# Patient Record
Sex: FEMALE | Race: BLACK OR AFRICAN AMERICAN | NOT HISPANIC OR LATINO | Employment: FULL TIME | URBAN - METROPOLITAN AREA
[De-identification: names, ages, dates, MRNs, and addresses within clinical notes are randomized per-mention and may not be internally consistent; named-entity substitution may affect disease eponyms.]

---

## 2020-09-18 ENCOUNTER — LAB VISIT (OUTPATIENT)
Dept: LAB | Facility: HOSPITAL | Age: 35
End: 2020-09-18
Payer: COMMERCIAL

## 2020-09-18 ENCOUNTER — OFFICE VISIT (OUTPATIENT)
Dept: FAMILY MEDICINE | Facility: CLINIC | Age: 35
End: 2020-09-18
Payer: COMMERCIAL

## 2020-09-18 VITALS
HEART RATE: 80 BPM | SYSTOLIC BLOOD PRESSURE: 122 MMHG | HEIGHT: 65 IN | DIASTOLIC BLOOD PRESSURE: 80 MMHG | WEIGHT: 176.38 LBS | TEMPERATURE: 98 F | BODY MASS INDEX: 29.38 KG/M2 | OXYGEN SATURATION: 98 %

## 2020-09-18 DIAGNOSIS — Z00.00 WELL ADULT EXAM: Primary | ICD-10-CM

## 2020-09-18 DIAGNOSIS — R55 NEAR SYNCOPE: ICD-10-CM

## 2020-09-18 DIAGNOSIS — Z00.00 WELL ADULT EXAM: ICD-10-CM

## 2020-09-18 LAB
ALBUMIN SERPL BCP-MCNC: 4 G/DL (ref 3.5–5.2)
ALP SERPL-CCNC: 60 U/L (ref 55–135)
ALT SERPL W/O P-5'-P-CCNC: 10 U/L (ref 10–44)
ANION GAP SERPL CALC-SCNC: 8 MMOL/L (ref 8–16)
AST SERPL-CCNC: 26 U/L (ref 10–40)
BASOPHILS # BLD AUTO: 0.05 K/UL (ref 0–0.2)
BASOPHILS NFR BLD: 0.5 % (ref 0–1.9)
BILIRUB SERPL-MCNC: 0.7 MG/DL (ref 0.1–1)
BUN SERPL-MCNC: 10 MG/DL (ref 6–20)
CALCIUM SERPL-MCNC: 9 MG/DL (ref 8.7–10.5)
CHLORIDE SERPL-SCNC: 106 MMOL/L (ref 95–110)
CHOLEST SERPL-MCNC: 164 MG/DL (ref 120–199)
CHOLEST/HDLC SERPL: 2.7 {RATIO} (ref 2–5)
CO2 SERPL-SCNC: 25 MMOL/L (ref 23–29)
CREAT SERPL-MCNC: 0.8 MG/DL (ref 0.5–1.4)
DIFFERENTIAL METHOD: ABNORMAL
EOSINOPHIL # BLD AUTO: 0.1 K/UL (ref 0–0.5)
EOSINOPHIL NFR BLD: 0.6 % (ref 0–8)
ERYTHROCYTE [DISTWIDTH] IN BLOOD BY AUTOMATED COUNT: 11.6 % (ref 11.5–14.5)
EST. GFR  (AFRICAN AMERICAN): >60 ML/MIN/1.73 M^2
EST. GFR  (NON AFRICAN AMERICAN): >60 ML/MIN/1.73 M^2
GLUCOSE SERPL-MCNC: 73 MG/DL (ref 70–110)
HCT VFR BLD AUTO: 36.3 % (ref 37–48.5)
HDLC SERPL-MCNC: 61 MG/DL (ref 40–75)
HDLC SERPL: 37.2 % (ref 20–50)
HGB BLD-MCNC: 11.3 G/DL (ref 12–16)
IMM GRANULOCYTES # BLD AUTO: 0.03 K/UL (ref 0–0.04)
IMM GRANULOCYTES NFR BLD AUTO: 0.3 % (ref 0–0.5)
LDLC SERPL CALC-MCNC: 94.4 MG/DL (ref 63–159)
LYMPHOCYTES # BLD AUTO: 2 K/UL (ref 1–4.8)
LYMPHOCYTES NFR BLD: 18.4 % (ref 18–48)
MCH RBC QN AUTO: 29.8 PG (ref 27–31)
MCHC RBC AUTO-ENTMCNC: 31.1 G/DL (ref 32–36)
MCV RBC AUTO: 96 FL (ref 82–98)
MONOCYTES # BLD AUTO: 1.1 K/UL (ref 0.3–1)
MONOCYTES NFR BLD: 10.1 % (ref 4–15)
NEUTROPHILS # BLD AUTO: 7.6 K/UL (ref 1.8–7.7)
NEUTROPHILS NFR BLD: 70.1 % (ref 38–73)
NONHDLC SERPL-MCNC: 103 MG/DL
NRBC BLD-RTO: 0 /100 WBC
PLATELET # BLD AUTO: 288 K/UL (ref 150–350)
PMV BLD AUTO: 11.1 FL (ref 9.2–12.9)
POTASSIUM SERPL-SCNC: 3.4 MMOL/L (ref 3.5–5.1)
PROT SERPL-MCNC: 7.7 G/DL (ref 6–8.4)
RBC # BLD AUTO: 3.79 M/UL (ref 4–5.4)
SODIUM SERPL-SCNC: 139 MMOL/L (ref 136–145)
TRIGL SERPL-MCNC: 43 MG/DL (ref 30–150)
TSH SERPL DL<=0.005 MIU/L-ACNC: 1.1 UIU/ML (ref 0.4–4)
WBC # BLD AUTO: 10.82 K/UL (ref 3.9–12.7)

## 2020-09-18 PROCEDURE — 86703 HIV-1/HIV-2 1 RESULT ANTBDY: CPT

## 2020-09-18 PROCEDURE — 99999 PR PBB SHADOW E&M-NEW PATIENT-LVL III: ICD-10-PCS | Mod: PBBFAC,,, | Performed by: FAMILY MEDICINE

## 2020-09-18 PROCEDURE — 99385 PR PREVENTIVE VISIT,NEW,18-39: ICD-10-PCS | Mod: S$GLB,,, | Performed by: FAMILY MEDICINE

## 2020-09-18 PROCEDURE — 99999 PR PBB SHADOW E&M-NEW PATIENT-LVL III: CPT | Mod: PBBFAC,,, | Performed by: FAMILY MEDICINE

## 2020-09-18 PROCEDURE — 99385 PREV VISIT NEW AGE 18-39: CPT | Mod: S$GLB,,, | Performed by: FAMILY MEDICINE

## 2020-09-18 PROCEDURE — 36415 COLL VENOUS BLD VENIPUNCTURE: CPT | Mod: PO

## 2020-09-18 PROCEDURE — 86803 HEPATITIS C AB TEST: CPT

## 2020-09-18 PROCEDURE — 80053 COMPREHEN METABOLIC PANEL: CPT

## 2020-09-18 PROCEDURE — 84443 ASSAY THYROID STIM HORMONE: CPT

## 2020-09-18 PROCEDURE — 85025 COMPLETE CBC W/AUTO DIFF WBC: CPT

## 2020-09-18 PROCEDURE — 83036 HEMOGLOBIN GLYCOSYLATED A1C: CPT

## 2020-09-18 PROCEDURE — 80061 LIPID PANEL: CPT

## 2020-09-19 LAB
ESTIMATED AVG GLUCOSE: 82 MG/DL (ref 68–131)
HBA1C MFR BLD HPLC: 4.5 % (ref 4–5.6)

## 2020-09-20 NOTE — PROGRESS NOTES
Chief Complaint:    Chief Complaint   Patient presents with    \[facility] [place] [facility]\"" Care       History of Present Illness:    Presents today to reestablish care  She is doing okay other than sometimes she feels like she is about to pass out happens pretty much on a daily basis only last for few seconds  Does not feel weak or any other symptoms not feel dizzy.    ROS:  Review of Systems   Constitutional: Negative for activity change, chills, fatigue, fever and unexpected weight change.   HENT: Negative for congestion, ear discharge, ear pain, hearing loss, postnasal drip and rhinorrhea.    Eyes: Negative for pain and visual disturbance.   Respiratory: Negative for cough, chest tightness and shortness of breath.    Cardiovascular: Negative for chest pain and palpitations.   Gastrointestinal: Negative for abdominal pain, diarrhea and vomiting.   Endocrine: Negative for heat intolerance.   Genitourinary: Negative for dysuria, flank pain, frequency and hematuria.   Musculoskeletal: Negative for back pain, gait problem and neck pain.   Skin: Negative for color change and rash.   Neurological: Negative for dizziness, tremors, seizures, numbness and headaches.   Psychiatric/Behavioral: Negative for agitation, hallucinations, self-injury, sleep disturbance and suicidal ideas. The patient is not nervous/anxious.        History reviewed. No pertinent past medical history.    Social History:  Social History     Socioeconomic History    Marital status: Single     Spouse name: Not on file    Number of children: 0    Years of education: Not on file    Highest education level: Not on file   Occupational History    Not on file   Social Needs    Financial resource strain: Not hard at all    Food insecurity     Worry: Never true     Inability: Never true    Transportation needs     Medical: No     Non-medical: No   Tobacco Use    Smoking status: Never Smoker    Smokeless tobacco: Never Used   Substance and Sexual Activity     "Alcohol use: Yes     Frequency: 2-4 times a month     Drinks per session: 1 or 2     Binge frequency: Monthly     Comment: occasional     Drug use: Never    Sexual activity: Yes     Partners: Female     Birth control/protection: None   Lifestyle    Physical activity     Days per week: 5 days     Minutes per session: 90 min    Stress: Not at all   Relationships    Social connections     Talks on phone: More than three times a week     Gets together: More than three times a week     Attends [employer] service: Not on file     Active member of club or organization: No     Attends meetings of clubs or organizations: Patient refused     Relationship status: Living with partner   Other Topics Concern    Not on file   Social History Narrative    Not on file       Family History:   family history includes Diabetes in her mother; Heart disease in her mother; Hypertension in her brother and mother.    Health Maintenance   Topic Date Due    Hepatitis C Screening  1986    TETANUS VACCINE  08/12/2004    Lipid Panel  Completed       Physical Exam:    Vital Signs  Temp: 98.2 °F (36.8 °C)  Temp src: Temporal  Pulse: 80  SpO2: 98 %  BP: 122/80  Pain Score: 0-No pain  Height and Weight  Height: 5' 5" (165.1 cm)  Weight: 80 kg (176 lb 5.9 oz)  BSA (Calculated - sq m): 1.92 sq meters  BMI (Calculated): 29.3  Weight in (lb) to have BMI = 25: 149.9]    Body mass index is 29.35 kg/m².    Physical Exam  Constitutional:       Appearance: She is well-developed.   Eyes:      Conjunctiva/sclera: Conjunctivae normal.      Pupils: Pupils are equal, round, and reactive to light.   Neck:      Musculoskeletal: Normal range of motion and neck supple.   Cardiovascular:      Rate and Rhythm: Normal rate and regular rhythm.      Heart sounds: Normal heart sounds. No murmur.   Pulmonary:      Effort: Pulmonary effort is normal. No respiratory distress.      Breath sounds: Normal breath sounds. No wheezing or rales.   Chest:      Chest " wall: No tenderness.   Abdominal:      General: There is no distension.      Palpations: Abdomen is soft. There is no mass.      Tenderness: There is no abdominal tenderness. There is no guarding.   Musculoskeletal:         General: No tenderness.   Lymphadenopathy:      Cervical: No cervical adenopathy.   Skin:     General: Skin is warm and dry.   Neurological:      Mental Status: She is alert and oriented to person, place, and time.      Deep Tendon Reflexes: Reflexes are normal and symmetric.   Psychiatric:         Behavior: Behavior normal.         Thought Content: Thought content normal.         Judgment: Judgment normal.           Assessment:      ICD-10-CM ICD-9-CM   1. Well adult exam  Z00.00 V70.0   2. Near syncope  R55 780.2         Plan:         Adelaida was seen today for establish care.    Diagnoses and all orders for this visit:    Well adult exam  -     CBC auto differential; Future  -     Comprehensive metabolic panel; Future  -     Lipid Panel; Future  -     Hemoglobin A1C; Future  -     HIV 1/2 Ag/Ab (4th Gen); Future  -     TSH; Future  -     Holter monitor - 48 hour; Future  -     Hepatitis C Antibody; Future    Near syncope  -     CBC auto differential; Future  -     Comprehensive metabolic panel; Future  -     Lipid Panel; Future  -     Hemoglobin A1C; Future  -     HIV 1/2 Ag/Ab (4th Gen); Future  -     TSH; Future  -     Holter monitor - 48 hour; Future  -     Hepatitis C Antibody; Future            Orders Placed This Encounter   Procedures    CBC auto differential    Comprehensive metabolic panel    Lipid Panel    Hemoglobin A1C    HIV 1/2 Ag/Ab (4th Gen)    TSH    Hepatitis C Antibody    Holter monitor - 48 hour       No current outpatient medications on file.     No current facility-administered medications for this visit.        There are no discontinued medications.    No follow-ups on file.      Yana Patel MD

## 2020-09-21 LAB — HIV 1+2 AB+HIV1 P24 AG SERPL QL IA: NEGATIVE

## 2020-09-22 LAB — HCV AB SERPL QL IA: NEGATIVE

## 2020-09-28 ENCOUNTER — PATIENT MESSAGE (OUTPATIENT)
Dept: FAMILY MEDICINE | Facility: CLINIC | Age: 35
End: 2020-09-28

## 2020-09-29 ENCOUNTER — PATIENT MESSAGE (OUTPATIENT)
Dept: FAMILY MEDICINE | Facility: CLINIC | Age: 35
End: 2020-09-29

## 2021-01-13 ENCOUNTER — TELEPHONE (OUTPATIENT)
Dept: FAMILY MEDICINE | Facility: CLINIC | Age: 36
End: 2021-01-13

## 2021-01-13 ENCOUNTER — PATIENT MESSAGE (OUTPATIENT)
Dept: FAMILY MEDICINE | Facility: CLINIC | Age: 36
End: 2021-01-13

## 2021-01-13 DIAGNOSIS — D64.9 ANEMIA, UNSPECIFIED TYPE: Primary | ICD-10-CM

## 2021-03-19 ENCOUNTER — PATIENT MESSAGE (OUTPATIENT)
Dept: FAMILY MEDICINE | Facility: CLINIC | Age: 36
End: 2021-03-19

## 2021-04-28 ENCOUNTER — PATIENT MESSAGE (OUTPATIENT)
Dept: RESEARCH | Facility: HOSPITAL | Age: 36
End: 2021-04-28

## 2021-08-26 ENCOUNTER — PATIENT MESSAGE (OUTPATIENT)
Dept: ADMINISTRATIVE | Facility: HOSPITAL | Age: 36
End: 2021-08-26

## 2021-08-27 ENCOUNTER — PATIENT MESSAGE (OUTPATIENT)
Dept: FAMILY MEDICINE | Facility: CLINIC | Age: 36
End: 2021-08-27

## 2021-09-21 ENCOUNTER — HOSPITAL ENCOUNTER (OUTPATIENT)
Dept: RADIOLOGY | Facility: HOSPITAL | Age: 36
Discharge: HOME | End: 2021-09-21
Attending: FAMILY MEDICINE
Payer: COMMERCIAL

## 2021-09-21 ENCOUNTER — OFFICE VISIT (OUTPATIENT)
Dept: FAMILY MEDICINE | Facility: CLINIC | Age: 36
End: 2021-09-21
Payer: COMMERCIAL

## 2021-09-21 VITALS
HEIGHT: 65 IN | HEART RATE: 70 BPM | SYSTOLIC BLOOD PRESSURE: 130 MMHG | BODY MASS INDEX: 34.31 KG/M2 | TEMPERATURE: 100 F | WEIGHT: 205.94 LBS | OXYGEN SATURATION: 100 % | DIASTOLIC BLOOD PRESSURE: 85 MMHG

## 2021-09-21 DIAGNOSIS — G89.29 HEEL PAIN, CHRONIC, LEFT: ICD-10-CM

## 2021-09-21 DIAGNOSIS — M79.672 HEEL PAIN, CHRONIC, LEFT: ICD-10-CM

## 2021-09-21 DIAGNOSIS — D64.9 ANEMIA, UNSPECIFIED TYPE: ICD-10-CM

## 2021-09-21 DIAGNOSIS — M79.671 RIGHT FOOT PAIN: ICD-10-CM

## 2021-09-21 DIAGNOSIS — R63.5 RECENT WEIGHT GAIN: Primary | ICD-10-CM

## 2021-09-21 PROCEDURE — 99214 OFFICE O/P EST MOD 30 MIN: CPT | Mod: S$GLB,,, | Performed by: FAMILY MEDICINE

## 2021-09-21 PROCEDURE — 99214 PR OFFICE/OUTPT VISIT, EST, LEVL IV, 30-39 MIN: ICD-10-PCS | Mod: S$GLB,,, | Performed by: FAMILY MEDICINE

## 2021-09-21 PROCEDURE — 73630 X-RAY EXAM OF FOOT: CPT | Mod: 26,RT,, | Performed by: RADIOLOGY

## 2021-09-21 PROCEDURE — 99999 PR PBB SHADOW E&M-EST. PATIENT-LVL III: ICD-10-PCS | Mod: PBBFAC,,, | Performed by: FAMILY MEDICINE

## 2021-09-21 PROCEDURE — 73630 XR FOOT COMPLETE 3 VIEW RIGHT: ICD-10-PCS | Mod: 26,RT,, | Performed by: RADIOLOGY

## 2021-09-21 PROCEDURE — 73630 X-RAY EXAM OF FOOT: CPT | Mod: TC,FY,PO,RT

## 2021-09-21 PROCEDURE — 99999 PR PBB SHADOW E&M-EST. PATIENT-LVL III: CPT | Mod: PBBFAC,,, | Performed by: FAMILY MEDICINE

## 2021-09-22 ENCOUNTER — PATIENT MESSAGE (OUTPATIENT)
Dept: FAMILY MEDICINE | Facility: CLINIC | Age: 36
End: 2021-09-22

## 2021-09-27 ENCOUNTER — PATIENT MESSAGE (OUTPATIENT)
Dept: FAMILY MEDICINE | Facility: CLINIC | Age: 36
End: 2021-09-27

## 2021-09-28 ENCOUNTER — TELEPHONE (OUTPATIENT)
Dept: FAMILY MEDICINE | Facility: CLINIC | Age: 36
End: 2021-09-28

## 2021-09-28 DIAGNOSIS — D64.9 ANEMIA, UNSPECIFIED TYPE: Primary | ICD-10-CM

## 2021-12-13 ENCOUNTER — PATIENT MESSAGE (OUTPATIENT)
Dept: FAMILY MEDICINE | Facility: CLINIC | Age: 36
End: 2021-12-13
Payer: COMMERCIAL

## 2021-12-20 ENCOUNTER — OFFICE VISIT (OUTPATIENT)
Dept: FAMILY MEDICINE | Facility: CLINIC | Age: 36
End: 2021-12-20
Payer: COMMERCIAL

## 2021-12-20 VITALS — WEIGHT: 181 LBS | HEIGHT: 65 IN | BODY MASS INDEX: 30.16 KG/M2

## 2021-12-20 DIAGNOSIS — E66.3 OVERWEIGHT (BMI 25.0-29.9): Primary | ICD-10-CM

## 2021-12-20 PROCEDURE — 99213 PR OFFICE/OUTPT VISIT, EST, LEVL III, 20-29 MIN: ICD-10-PCS | Mod: 95,,, | Performed by: FAMILY MEDICINE

## 2021-12-20 PROCEDURE — 99213 OFFICE O/P EST LOW 20 MIN: CPT | Mod: 95,,, | Performed by: FAMILY MEDICINE

## 2022-02-15 ENCOUNTER — PATIENT MESSAGE (OUTPATIENT)
Dept: HEMATOLOGY/ONCOLOGY | Facility: CLINIC | Age: 37
End: 2022-02-15
Payer: COMMERCIAL

## 2023-07-31 ENCOUNTER — PATIENT MESSAGE (OUTPATIENT)
Dept: RESEARCH | Facility: HOSPITAL | Age: 38
End: 2023-07-31
Payer: COMMERCIAL

## 2023-11-27 DIAGNOSIS — E66.3 OVERWEIGHT (BMI 25.0-29.9): Primary | ICD-10-CM

## 2024-06-10 ENCOUNTER — PATIENT MESSAGE (OUTPATIENT)
Dept: FAMILY MEDICINE | Facility: CLINIC | Age: 39
End: 2024-06-10

## 2024-06-27 ENCOUNTER — TELEPHONE (OUTPATIENT)
Dept: FAMILY MEDICINE | Facility: CLINIC | Age: 39
End: 2024-06-27

## 2024-06-27 ENCOUNTER — E-VISIT (OUTPATIENT)
Dept: FAMILY MEDICINE | Facility: CLINIC | Age: 39
End: 2024-06-27
Payer: COMMERCIAL

## 2024-06-27 DIAGNOSIS — E66.3 OVERWEIGHT (BMI 25.0-29.9): Primary | ICD-10-CM

## 2024-07-11 ENCOUNTER — E-VISIT (OUTPATIENT)
Dept: FAMILY MEDICINE | Facility: CLINIC | Age: 39
End: 2024-07-11
Payer: COMMERCIAL

## 2024-07-11 DIAGNOSIS — J06.9 UPPER RESPIRATORY TRACT INFECTION, UNSPECIFIED TYPE: Primary | ICD-10-CM

## 2024-07-12 RX ORDER — BENZONATATE 200 MG/1
200 CAPSULE ORAL 3 TIMES DAILY PRN
Qty: 30 CAPSULE | Refills: 0 | Status: SHIPPED | OUTPATIENT
Start: 2024-07-12 | End: 2024-07-22

## 2024-07-26 ENCOUNTER — OFFICE VISIT (OUTPATIENT)
Dept: FAMILY MEDICINE | Facility: CLINIC | Age: 39
End: 2024-07-26
Payer: COMMERCIAL

## 2024-07-26 VITALS
WEIGHT: 215.81 LBS | HEIGHT: 65 IN | OXYGEN SATURATION: 100 % | SYSTOLIC BLOOD PRESSURE: 151 MMHG | RESPIRATION RATE: 18 BRPM | BODY MASS INDEX: 35.96 KG/M2 | DIASTOLIC BLOOD PRESSURE: 101 MMHG | HEART RATE: 75 BPM

## 2024-07-26 DIAGNOSIS — E66.9 OBESITY (BMI 35.0-39.9 WITHOUT COMORBIDITY): Primary | ICD-10-CM

## 2024-07-26 DIAGNOSIS — Z00.00 WELL ADULT EXAM: ICD-10-CM

## 2024-07-26 DIAGNOSIS — I10 PRIMARY HYPERTENSION: ICD-10-CM

## 2024-07-26 PROCEDURE — 99213 OFFICE O/P EST LOW 20 MIN: CPT | Mod: 25,S$GLB,, | Performed by: FAMILY MEDICINE

## 2024-07-26 PROCEDURE — 99395 PREV VISIT EST AGE 18-39: CPT | Mod: S$GLB,,, | Performed by: FAMILY MEDICINE

## 2024-07-26 PROCEDURE — 99999 PR PBB SHADOW E&M-EST. PATIENT-LVL III: CPT | Mod: PBBFAC,,, | Performed by: FAMILY MEDICINE

## 2024-07-26 RX ORDER — VALSARTAN AND HYDROCHLOROTHIAZIDE 160; 12.5 MG/1; MG/1
1 TABLET, FILM COATED ORAL DAILY
Qty: 30 TABLET | Refills: 3 | Status: SHIPPED | OUTPATIENT
Start: 2024-07-26 | End: 2025-07-26

## 2024-10-10 ENCOUNTER — HOSPITAL ENCOUNTER (OUTPATIENT)
Dept: RADIOLOGY | Facility: HOSPITAL | Age: 39
Discharge: HOME | End: 2024-10-10
Attending: PODIATRIST
Payer: COMMERCIAL

## 2024-10-10 ENCOUNTER — OFFICE VISIT (OUTPATIENT)
Dept: PODIATRY | Facility: CLINIC | Age: 39
End: 2024-10-10
Payer: COMMERCIAL

## 2024-10-10 VITALS — BODY MASS INDEX: 35.96 KG/M2 | WEIGHT: 215.81 LBS | HEIGHT: 65 IN

## 2024-10-10 DIAGNOSIS — M62.462 GASTROCNEMIUS EQUINUS OF LEFT LOWER EXTREMITY: ICD-10-CM

## 2024-10-10 DIAGNOSIS — M72.2 PLANTAR FASCIITIS, BILATERAL: ICD-10-CM

## 2024-10-10 DIAGNOSIS — M20.21 HALLUX RIGIDUS, RIGHT FOOT: Primary | ICD-10-CM

## 2024-10-10 DIAGNOSIS — M62.461 GASTROCNEMIUS EQUINUS OF RIGHT LOWER EXTREMITY: ICD-10-CM

## 2024-10-10 DIAGNOSIS — M20.21 HALLUX RIGIDUS, RIGHT FOOT: ICD-10-CM

## 2024-10-10 DIAGNOSIS — L60.2 NAIL DISORDER (ONYCHOGRYPHOSIS): ICD-10-CM

## 2024-10-10 PROCEDURE — 99204 OFFICE O/P NEW MOD 45 MIN: CPT | Mod: S$GLB,,, | Performed by: PODIATRIST

## 2024-10-10 PROCEDURE — 73630 X-RAY EXAM OF FOOT: CPT | Mod: TC,RT

## 2024-10-10 PROCEDURE — 99999 PR PBB SHADOW E&M-EST. PATIENT-LVL III: CPT | Mod: PBBFAC,,, | Performed by: PODIATRIST

## 2024-10-10 PROCEDURE — 87101 SKIN FUNGI CULTURE: CPT | Performed by: PODIATRIST

## 2024-10-10 PROCEDURE — 73630 X-RAY EXAM OF FOOT: CPT | Mod: 26,RT,, | Performed by: RADIOLOGY

## 2024-10-10 RX ORDER — DICLOFENAC SODIUM 75 MG/1
75 TABLET, DELAYED RELEASE ORAL 2 TIMES DAILY
Qty: 20 TABLET | Refills: 0 | Status: SHIPPED | OUTPATIENT
Start: 2024-10-10 | End: 2024-10-20

## 2024-10-10 RX ORDER — CICLOPIROX 80 MG/ML
SOLUTION TOPICAL
Qty: 6.6 ML | Refills: 11 | Status: SHIPPED | OUTPATIENT
Start: 2024-10-10

## 2024-10-10 NOTE — PATIENT INSTRUCTIONS
Thank you for choosing [facility] and Dr. Ilnaa Johnson and her team to take care of you.      I have provided further information for you about your diagnoses and/or aftercare for treatment.     You may receive a survey asking you about your visit today. We hope that we have provided you with a 5-star experience! If you felt that you received exemplary care today, please consider leaving us this feedback on the survey that you will receive in the next few days.     The survey might arrive via email, [facility] messages, text messages, or paper mail.    We sincerely appreciate you!      Sincerely,  Dr. Ilana Johnson         PENLAC ANTI-FUNGAL TOPICAL INSTRUCTIONS   1. You have been prescribed Penlac nail lacquer (Ciclopirox) topical solution 8%. Go and  the prescription from your local pharmacy.    2. Remove any nail polish on your feet. File away (with emery board) loose nail material and trim nails.    3. Apply the Penlac nail lacquer (ciclopirox) Topical Solution, 8% once daily (preferably at bedtime or eight hours before washing) to all affected nails with the applicator brush provided. The nail lacquer should be applied evenly over the entire nail plate. If possible, apply the solution to the nail bed, hyponychium, and the under surface of the nail plate when if your nail is loosely attached. Remember fungus lives under the nail plate, therefore the more the solution penetrates the nail bed, the better.    3. Continue to apply the solution daily (at bedtime preferably).  Daily applications should be made over the previous coat and removed with alcohol every seven days. This cycle should be repeated throughout the duration of therapy.     4. On the 7th day, remove the solution from all of your nails with alcohol. File your nails again with an emery board and then repeat the cycle again.    5. This treatment must be consistent. If you skip a day, continue the cycle as recommended. It may take up  to 1 year for your nails to clear the fungal infection. Be patient.    It is recommended to dispose of all infected shoe gear that you will not likely wear again as well as weekly cleansing of all showering/bathing areas with antiseptics.Fungal spores like to hide in dark, warm, moist environments. Lastly, monthly treatments of all current shoe gear with moth balls x 8 hours.                  FUNGAL NAIL INFECTIONS    If your nail is thick and looks white or yellow, it may be infected with fungus. Nail   infections don't look pleasant, but they are not serious. There are treatments that   can clear up the infection.     What is a fungal nail infection?   It's easy to catch a fungal nail infection, and lots of people get them. The sooner you   treat an infection, the easier it is to get rid of it. The fungi that cause these infections often live in warm, damp places, such as showers and floors around changing rooms.   People used to think there was nothing you could do about a fungal nail infection. But   there are now good treatments that can get rid of it. However, they take a long time to   work (as long as one year if the infection is bad). So you need to be patient.    Fungal infection of the nails causes changes in the appearance of fingernails and toenails. They may thicken, discolor, change shape or split. This condition is difficult to treat because nails grow slowly and have limited blood supply. It is common to have the infection come back after treatment.       What are the symptoms?   Your nail may look whitish or yellowish, and raised up from the finger or toe. The skin   around your nail may turn red. Sometimes the nail lifts off altogether. If the infection is   severe, the nail may also be crumbly. It's more common to get an infection of a toenail   than a fingernail. If you've had an infection for a long time, it may be painful. If you have a badly infected toe, it may be difficult to walk. If your  "immune system is weak or you have diabetes, you may be more likely to get these infections. The infection can also be more serious. So it's important to see your doctor as soon as possible if you think you have a nail infection.     What treatments work?   To get rid of a fungal nail infection you will probably have to take tablets, sometimes for   several months. There are also topical solutions (over the counter and prescription) that  you can put on your nail, and  things you can do to try to avoid getting another nail infection.    There are two types of medicines used.   Topical anti-fungal medicines are applied to the surface of the skin and nail area. These medicines are not very effective because they cannot get deep into the nail.     Topical medicines are most useful in combination with oral medicines . Oral antifungal medicines are more effective because they penetrate the nail from the inside out.  If medicines fail, the nail can be removed surgically or chemically. This improves the effectiveness of medical treatment because the fungus is physically removed from the body.       Tablets   The tablets that doctors use most often are called itraconazole (brand name Sporanox)   and terbinafine (Lamisil). Terbinafine seems to work slightly better. If you take terbinafine every day for 12 weeks, there's a 6 in 10 chance you'll get rid of   your fungal toenail infection.      How are fungal nail infections treated? -- Treatment depends, in part, on how severe the infection is, and how much it bothers you. If your infection is mild or doesn't bother you very much, you might choose not to treat it. An untreated nail infection probably won't go away, but it probably won't cause any long-term problems either.  When people need or choose to have treatment, it usually involves "antifungal" medicines that you get with a prescription from your doctor. These medicines are taken by mouth or put on the nail.Treatment " with pills usually lasts a few months. Some people who take these medicines need to have blood tests. That's because these medicines can affect the liver.  If you don't want to or can't take antifungal pills, your doctor will talk with you about other treatment options. These might include using an antifungal medicine on the nail or having surgery to remove your nail.    Before starting any of these treatments, you should know that:  ?It can take many months for your nail to look normal again.  ?There is a chance that the treatment won't work. The infection might not get better, or it might come back. If either of these things happen, your doctor can try another treatment or send you to a specialist.  Can fungal nail infections be prevented? -- Sometimes. To reduce your chance of getting one, you can:  ?Keep your feet clean and dry.  ?Avoid sharing nail tools, such as clippers and scissors.  ?Wear flip-flops or other footwear in a gym shower or locker room.  What if I want to get pregnant? -- If you want to get pregnant, let your doctor or nurse know. He or she might recommend that you not take certain antifungal medicines during pregnancy.    Alternative final options are:   If still no resolution with oral tablets or topics: then we can completely avulse/remove all involved toenails with subsequent treatment with topical antifungal solution.       Home Care:   1) Use medicines exactly as directed for as long as directed. Treating a fungal infection can take longer than other kinds of infections.   2) Smoking is a risk factor for fungal infection. This is one more reason to quit.   3) Wear absorbent socks and shoes that have ventilation. Sweaty feet increases risk of fungal infection and make an existing infection harder to treat.   4) Use footwear when in damp public places like swimming pools, gyms and shower rooms. This helps avoid exposure to the fungus that grows there.   It is recommended to dispose of all  infected shoe gear that you will not likely wear again as well as weekly cleansing of all showering/bathing areas with antiseptics.Fungal spores like to hide in dark, warm, moist environments. Lastly, monthly treatments of all current shoe gear with moth balls x 8 hours.       Follow Up   with your doctor as directed by our staff.   Get Prompt Medical Attention   if any of the following occur:   -- Skin alongside the nail becomes reddened, swollen, painful or drains pus   -- Side effects from oral anti-fungal medicines       © 7545-7171 [employer]. [address] [place] [address]. All rights reserved. This information is not intended as a substitute for professional medical care. Always follow your healthcare professional's instructions.

## 2024-10-10 NOTE — PROGRESS NOTES
Podiatry Department    Patient ID: Adelaida Riddle is a 39 y.o. female.    Chief Complaint: Nail Problem (C/o  toenail growth and toe pain , pt right great toe is really stiff and swollen and c/o left great toe is discolored and c/o heel pain also, pt rates pain 8/10, pt is non-diabetic)    History of Present Illness    CHIEF COMPLAINT:  Patient presents today for foot pain and swelling.    RIGHT FOOT PAIN AND SWELLING:  She reports right foot swelling and pain since last year, exacerbated by walking. A scan performed by her PCP in January of last year reportedly indicated possible bone deterioration. No history of sports-related injuries or accidents.    BILATERAL HEEL PAIN:  She experiences heel pain in both feet, localized to the heel area and worsened by walking. This may be consistent with plantar fasciitis. Her work as a  requires frequent standing, and she wears steel toe boots for work.    RIGHT FIRST MTPJ ARTHRITIS:  She reports pain in the right first metatarsophalangeal joint (MTPJ) with limited range of motion. She has a history of playing basketball. An X-ray in 2021 showed evidence of arthritis.    TOENAIL FUNGUS:  She presents with toenail fungus affecting multiple toes. The left hallux has a darkened dystrophic toenail. The bilateral fifth toenails show dark discoloration with subungual debris. She denies pain associated with the fungal infection. No Montemayor sign observed on the left fifth toenail.    PENDING LABORATORY TESTS:  She was ordered to complete labs two weeks ago by her primary care physician, including a comprehensive panel and hemoglobin. These tests have not yet been completed.      ROS:  Musculoskeletal: -muscle pain, -joint pain, +limb swelling            Physical Exam    MSK: Foot/Ankle - Right: Limited range of motion with clicking and crepitus with dorsiflexion in right first MTPJ. Palpable exostosis on right first MTPJ.  MSK: Foot/Ankle - Left: Darkened  dystrophic toenail on left hallux. Negative Montemayor sign on left fifth toenail. Dark and discolored toenail with subungual debris on bilateral fifth toenail. Pronated foot type. Decreased dorsiflexion of ankle with knee extended and flexed. Mild tenderness to palpation at the medial tubercle of calcaneus.           Diagnoses:  1. Hallux rigidus, right foot  -     X-Ray Foot Complete Right; Future; Expected date: 10/10/2024    2. Plantar fasciitis, bilateral    3. Gastrocnemius equinus of right lower extremity    4. Gastrocnemius equinus of left lower extremity    5. Nail disorder (onychogryphosis)  -     Fungal culture , skin, hair, or nails  -     Hepatic function panel; Future; Expected date: 10/10/2024    Other orders  -     diclofenac (VOLTAREN) 75 MG EC tablet; Take 1 tablet (75 mg total) by mouth 2 (two) times daily. for 10 days  Dispense: 20 tablet; Refill: 0  -     ciclopirox (PENLAC) 8 % Soln; Apply to affected toenails at night time DAILY. On 7th day, file nails down, clean all nails with alcohol and restart application process.  Dispense: 6.6 mL; Refill: 11        Assessment & Plan    - Assessed right first MTPJ, noting severe arthritis with limited range of motion, clicking, and crepitus  - Evaluated bilateral heel pain, diagnosing plantar fasciitis due to tight Achilles tendon  - Identified fungal infection in toenails, considering topical and oral treatment options  - Reviewed previous x-ray from 2021, confirming arthritis progression  - Will obtain new x-ray for current arthritis assessment  - Considered steroid injection for toe joint if oral medication ineffective    PLANTAR FASCIITIS:  - Explained plantar fasciitis condition and its relation to tight Achilles tendon.  - Patient to perform prescribed stretching exercises 3 times daily for plantar fasciitis.  - Started diclofenac for 10 days for plantar fasciitis and arthritis pain.  - Ordered x-ray of right foot.    TOENAIL FUNGUS:  - Discussed  treatment options for toenail fungus, including topical and oral medications.  - Educated about the timeframe for fungal treatments: oral medication typically takes 3-6 months, while topical treatment may take 6-12 months.  - Started antifungal nail polish for toenail fungus, to be used as directed in patient information printout.  - Ordered nail culture of affected toenails.    FLAT FEET AND ARTHRITIS:  - Discussed appropriate footwear for flat feet and arthritis, recommending [employer] and [employer] brands.  - Patient to visit [employer] for orthotic inserts (10% discount provided).  - Recommend wearing motion-controlled shoes instead of steel-toed boots when possible.  - Referred to [employer] for custom orthotic inserts.    LABS:  - Ordered LFT.  - Ordered Comprehensive metabolic panel and hemoglobin A1C (as part of primary care follow-up).    FOLLOW UP:  - Follow up to review x-ray results and reassess pain.  - Contact the office if oral medication for toe joint pain is ineffective, to consider steroid injection.        The total visit time face to face with the patient was over 30 minutes. Time spent in counseling, discussion and coordination of care with the patient was over 15 minutes. Topics discussed as noted above.        Future Appointments   Date Time Provider Department Center   11/27/2024  2:15 PM Ilana Johnson DPM [facility]        This note was generated with the assistance of ambient listening technology. Verbal consent was obtained by the patient and accompanying visitor(s) for the recording of patient appointment to facilitate this note. I attest to having reviewed and edited the generated note for accuracy, though some syntax or spelling errors may persist. Please contact the author of this note for any clarification.      Report Electronically Signed By:     Ilana Johnson DPM   Podiatry  [facility]-   10/14/2024

## 2024-10-14 ENCOUNTER — PATIENT MESSAGE (OUTPATIENT)
Dept: FAMILY MEDICINE | Facility: CLINIC | Age: 39
End: 2024-10-14
Payer: COMMERCIAL

## 2024-10-14 DIAGNOSIS — D64.9 ANEMIA, UNSPECIFIED TYPE: Primary | ICD-10-CM

## 2024-10-14 LAB — FUNGUS BLD CULT: NORMAL

## 2024-10-24 ENCOUNTER — PATIENT MESSAGE (OUTPATIENT)
Dept: RESEARCH | Facility: HOSPITAL | Age: 39
End: 2024-10-24
Payer: COMMERCIAL

## 2024-12-03 ENCOUNTER — TELEPHONE (OUTPATIENT)
Dept: PODIATRY | Facility: CLINIC | Age: 39
End: 2024-12-03
Payer: COMMERCIAL

## 2024-12-27 RX ORDER — VALSARTAN AND HYDROCHLOROTHIAZIDE 160; 12.5 MG/1; MG/1
1 TABLET, FILM COATED ORAL DAILY
Qty: 30 TABLET | Refills: 3 | Status: SHIPPED | OUTPATIENT
Start: 2024-12-27 | End: 2025-12-27

## 2024-12-27 NOTE — TELEPHONE ENCOUNTER
No care due was identified.  Health [facility] Embedded Care Due Messages. Reference number: [id number].   12/26/2024 7:53:43 PM CST

## 2025-01-05 NOTE — TELEPHONE ENCOUNTER
No care due was identified.  Health [facility] Embedded Care Due Messages. Reference number: [id number].   1/05/2025 3:27:40 PM CST

## 2025-01-06 RX ORDER — VALSARTAN AND HYDROCHLOROTHIAZIDE 160; 12.5 MG/1; MG/1
1 TABLET, FILM COATED ORAL DAILY
Qty: 30 TABLET | Refills: 3 | Status: SHIPPED | OUTPATIENT
Start: 2025-01-06 | End: 2025-01-08 | Stop reason: SDUPTHER

## 2025-01-08 RX ORDER — VALSARTAN AND HYDROCHLOROTHIAZIDE 160; 12.5 MG/1; MG/1
1 TABLET, FILM COATED ORAL DAILY
Qty: 30 TABLET | Refills: 3 | Status: SHIPPED | OUTPATIENT
Start: 2025-01-08 | End: 2026-01-08

## 2025-01-08 NOTE — TELEPHONE ENCOUNTER
No care due was identified.  Health [facility] Embedded Care Due Messages. Reference number: [id number].   1/08/2025 9:52:11 AM CST

## 2025-01-09 ENCOUNTER — PATIENT MESSAGE (OUTPATIENT)
Dept: FAMILY MEDICINE | Facility: CLINIC | Age: 40
End: 2025-01-09
Payer: COMMERCIAL

## 2025-05-16 ENCOUNTER — PATIENT MESSAGE (OUTPATIENT)
Dept: FAMILY MEDICINE | Facility: CLINIC | Age: 40
End: 2025-05-16
Payer: COMMERCIAL

## 2025-05-16 DIAGNOSIS — B37.9 YEAST INFECTION: Primary | ICD-10-CM

## 2025-05-18 RX ORDER — FLUCONAZOLE 150 MG/1
150 TABLET ORAL DAILY
Qty: 1 TABLET | Refills: 0 | Status: SHIPPED | OUTPATIENT
Start: 2025-05-18 | End: 2025-05-19

## 2025-06-12 ENCOUNTER — OFFICE VISIT (OUTPATIENT)
Dept: FAMILY MEDICINE | Facility: CLINIC | Age: 40
End: 2025-06-12
Payer: COMMERCIAL

## 2025-06-12 VITALS
HEIGHT: 62 IN | HEART RATE: 74 BPM | DIASTOLIC BLOOD PRESSURE: 80 MMHG | WEIGHT: 227.38 LBS | OXYGEN SATURATION: 100 % | BODY MASS INDEX: 41.84 KG/M2 | SYSTOLIC BLOOD PRESSURE: 110 MMHG | TEMPERATURE: 99 F

## 2025-06-12 DIAGNOSIS — E66.01 MORBID OBESITY WITH BMI OF 40.0-44.9, ADULT: ICD-10-CM

## 2025-06-12 DIAGNOSIS — Z00.00 WELL ADULT EXAM: Primary | ICD-10-CM

## 2025-06-12 DIAGNOSIS — I10 PRIMARY HYPERTENSION: ICD-10-CM

## 2025-06-12 PROCEDURE — 99999 PR PBB SHADOW E&M-EST. PATIENT-LVL III: CPT | Mod: PBBFAC,,, | Performed by: FAMILY MEDICINE

## 2025-06-12 RX ORDER — VALSARTAN AND HYDROCHLOROTHIAZIDE 160; 12.5 MG/1; MG/1
1 TABLET, FILM COATED ORAL DAILY
Qty: 30 TABLET | Refills: 12 | Status: SHIPPED | OUTPATIENT
Start: 2025-06-12 | End: 2026-06-12

## 2025-06-12 NOTE — PROGRESS NOTES
Chief Complaint:    Chief Complaint   Patient presents with    Annual Exam       History of Present Illness:    History of Present Illness    Patient presents today for follow up She reports good blood pressure control with regular medication compliance. She has approximately two weeks of blood pressure medication remaining. She reports previous weight management treatment with sublingual drops was ineffective due to persistent hunger and difficulty adhering to the dosing schedule. She has plantar fasciitis with pain that is exacerbated by increased body weight. She has been using antifungal medication for approximately three months. She has not received a tetanus vaccine in over 10 years.      ROS:  General: denies fever, denies chills, denies fatigue, denies weight gain, denies weight loss, denies loss of appetite  Eyes: denies vision changes, denies blurry vision, denies eye pain, denies eye discharge  ENT: denies ear pain, denies hearing loss, denies tinnitus, denies nasal congestion, denies sore throat  Cardiovascular: denies chest pain, denies palpitations, denies lower extremity edema  Respiratory: denies cough, denies shortness of breath, denies wheezing, denies sputum production  Endocrine: denies polyuria, denies polydipsia, denies heat intolerance, denies cold intolerance  Gastrointestinal: denies abdominal pain, denies heartburn, denies nausea, denies vomiting, denies diarrhea, denies constipation, denies blood in stool  Genitourinary: denies dysuria, denies urgency, denies frequency, denies hematuria, denies nocturia, denies incontinence  Heme & Lymphatic: denies easy or excessive bleeding, denies easy bruising, denies swollen lymph nodes  Musculoskeletal: denies muscle pain, denies back pain, denies joint pain, denies joint swelling, admits limb pain, admits pain with movement  Skin: denies rash, denies lesion, denies itching, denies skin texture changes, denies skin color changes  Neurological: denies  headache, denies dizziness, denies numbness, denies tingling, denies seizure activity, denies speech difficulty, denies memory loss, denies confusion  Psychiatric: denies anxiety, denies depression, denies sleep difficulty           Past Medical History:   Diagnosis Date    Abnormal Pap smear of cervix 01/15/2021    ASCUS Pap/+HPV 16    Anemia     Trichomoniasis 5/12/2021    Vaginitis and vulvovaginitis 5/12/2021       Social History:  Social History     Socioeconomic History    Marital status: Single    Number of children: 0   Tobacco Use    Smoking status: Never    Smokeless tobacco: Never   Substance and Sexual Activity    Alcohol use: Yes     Comment: occasional     Drug use: Never    Sexual activity: Yes     Partners: Female     Birth control/protection: None     Social Drivers of Health     Financial Resource Strain: Low Risk  (7/26/2024)    Overall Financial Resource Strain (CARDIA)     Difficulty of Paying Living Expenses: Not hard at all   Food Insecurity: No Food Insecurity (7/26/2024)    Hunger Vital Sign     Worried About Running Out of Food in the Last Year: Never true     Ran Out of Food in the Last Year: Never true   Transportation Needs: No Transportation Needs (12/20/2021)    PRAPARE - Transportation     Lack of Transportation (Medical): No     Lack of Transportation (Non-Medical): No   Physical Activity: Sufficiently Active (7/26/2024)    Exercise Vital Sign     Days of Exercise per Week: 3 days     Minutes of Exercise per Session: 80 min   Stress: No Stress Concern Present (7/26/2024)    [employer] - Occupational Stress Questionnaire     Feeling of Stress : Only a little   Housing Stability: Low Risk  (12/20/2021)    Housing Stability Vital Sign     Unable to Pay for Housing in the Last Year: No     Number of Places Lived in the Last Year: 1     Unstable Housing in the Last Year: No       Family History:   family history includes Diabetes in her mother and another  "family member; Heart disease in her mother and another family member; Hypertension in her brother, mother, and another family member.    Health Maintenance   Topic Date Due    TETANUS VACCINE  Never done    Cervical Cancer Screening  04/20/2024    COVID-19 Vaccine (3 - 2024-25 season) 09/01/2024    Influenza Vaccine (Season Ended) 09/01/2025    Hemoglobin A1c (Diabetic Prevention Screening)  10/10/2027    RSV Vaccine (Age 60+ and Pregnant patients) (1 - 1-dose 75+ series) 08/12/2061    Hepatitis C Screening  Completed    HIV Screening  Completed    Lipid Panel  Completed    Pneumococcal Vaccines (Age 0-49)  Aged Out       Exam:Physical     Vital Signs  Temp: 98.8 °F (37.1 °C)  Temp Source: Oral  Pulse: 74  SpO2: 100 %  BP: 110/80  BP Location: Left arm  Patient Position: Sitting  Pain Score: 0-No pain  Height and Weight  Height: 5' 2" (157.5 cm)  Weight: 103.1 kg (227 lb 6.5 oz)  BSA (Calculated - sq m): 2.12 sq meters  BMI (Calculated): 41.6  Weight in (lb) to have BMI = 25: 136.4]    Body mass index is 41.59 kg/m².    Physical Exam    General: Well-developed. Well-nourished. No acute distress.  Eyes: EOMI. Sclerae anicteric.  HENT: Normocephalic. Atraumatic. Nares patent. Moist oral mucosa.  Cardiovascular: Regular rate. Regular rhythm. No murmurs. No rubs. No gallops. Normal S1, S2.  Respiratory: Normal respiratory effort. Clear to auscultation bilaterally. No rales. No rhonchi. No wheezing.  Musculoskeletal: No  obvious deformity.  Extremities: No lower extremity edema.  Neurological: Alert & oriented x3. No slurred speech. Normal gait.  Psychiatric: Normal mood. Normal affect. Good insight. Good judgment.  Skin: Warm. Dry. No rash.           Assessment:        ICD-10-CM ICD-9-CM   1. Well adult exam  Z00.00 V70.0   2. Primary hypertension  I10 401.9   3. Morbid obesity with BMI of 40.0-44.9, adult  E66.01 278.01    Z68.41 V85.41         Plan:    Assessment & Plan    MEDICAL DECISION MAKING:  - Assessed BP " control, noting good compliance with current medication regimen.  - Evaluated weight loss efforts.  - Determined patient is a candidate for injectable weight loss medication (semaglutide/Ozempic), despite insurance coverage limitations due to non-diabetic status.  - Considered compounding pharmacy option for more affordable access to weight loss medication.  - Reviewed plantar fasciitis, noting potential exacerbation due to weight.  - Assessed need for tetanus vaccination, noting it has been over 10 years since last dose.    PATIENT EDUCATION:  - Explained that tetanus vaccination is recommended every 10 years for adults.  - Informed about potential side effects of weight loss medication, including nausea and stomach upset, typically lasting a few days.  - Educated on the appetite-suppressing mechanism of Ozempic.    MEDICATIONS:  - Started Ozempic (semaglutide) at a low dose, to be administered as a weekly self-injection.  - Continued BP medication.    ORDERS:  - Ordered labs.         Adelaida was seen today for annual exam.    Diagnoses and all orders for this visit:    Well adult exam  -     CBC Auto Differential; Future  -     Comprehensive Metabolic Panel; Future  -     Hemoglobin A1C; Future  -     Lipid Panel; Future    Primary hypertension  -     CBC Auto Differential; Future  -     Comprehensive Metabolic Panel; Future  -     Hemoglobin A1C; Future  -     Lipid Panel; Future    Morbid obesity with BMI of 40.0-44.9, adult    Other orders  -     valsartan-hydrochlorothiazide (DIOVAN-HCT) 160-12.5 mg per tablet; Take 1 tablet by mouth once daily.        No follow-ups on file.      Yana Patel MD

## 2025-06-13 ENCOUNTER — PATIENT MESSAGE (OUTPATIENT)
Dept: FAMILY MEDICINE | Facility: CLINIC | Age: 40
End: 2025-06-13
Payer: COMMERCIAL

## 2025-06-16 NOTE — TELEPHONE ENCOUNTER
No care due was identified.  [facility] Embedded Care Due Messages. Reference number: [id number].   6/16/2025 11:18:12 AM CDT

## 2025-07-08 ENCOUNTER — PATIENT MESSAGE (OUTPATIENT)
Dept: FAMILY MEDICINE | Facility: CLINIC | Age: 40
End: 2025-07-08
Payer: COMMERCIAL

## 2025-07-15 ENCOUNTER — PATIENT MESSAGE (OUTPATIENT)
Dept: FAMILY MEDICINE | Facility: CLINIC | Age: 40
End: 2025-07-15
Payer: COMMERCIAL

## 2025-07-15 DIAGNOSIS — E66.01 MORBID OBESITY WITH BMI OF 40.0-44.9, ADULT: Primary | ICD-10-CM

## 2025-07-16 NOTE — TELEPHONE ENCOUNTER
Care Due:                  Date            Visit Type   Department     Provider  --------------------------------------------------------------------------------                                EP -                              PRIMARY      [facility]  Last Visit: 06-      CARE ([facility])   MEDICINE       Yana Patel  Next Visit: None Scheduled  None         None Found                                                            Last  Test          Frequency    Reason                     Performed    Due Date  --------------------------------------------------------------------------------    CMP.........  12 months..  valsartan-hydrochlorothia  10-   10-                             zide.....................    HBA1C.......  6 months...  tirzepatide,.............  10-   04-    [facility] Embedded Care Due Messages. Reference number: [id number].   7/16/2025 11:44:22 AM CDT

## 2025-07-24 ENCOUNTER — OFFICE VISIT (OUTPATIENT)
Dept: FAMILY MEDICINE | Facility: CLINIC | Age: 40
End: 2025-07-24
Payer: COMMERCIAL

## 2025-07-24 VITALS — WEIGHT: 209 LBS | SYSTOLIC BLOOD PRESSURE: 110 MMHG | DIASTOLIC BLOOD PRESSURE: 88 MMHG | BODY MASS INDEX: 38.23 KG/M2

## 2025-07-24 DIAGNOSIS — E66.01 MORBID OBESITY WITH BMI OF 40.0-44.9, ADULT: ICD-10-CM

## 2025-07-24 DIAGNOSIS — I10 PRIMARY HYPERTENSION: Primary | ICD-10-CM

## 2025-07-24 NOTE — PROGRESS NOTES
Chief Complaint:  No chief complaint on file.      History of Present Illness:    History of Present Illness    Patient presents today for medication follow up and dose adjustment of tirzepatide. She reports current weight of 209 lbs, down from 227 lbs. She has been engaging in weight training exercises to support weight loss and muscle development. She reports blood pressure typically runs around 110/88 on self-monitoring without concerns. She continues tirzepatide 2.5 mg without side effects and valsartan hydrochlorothiazide daily with good compliance.      ROS:  General: denies fever, denies chills, denies fatigue, denies weight gain, denies weight loss, denies loss of appetite  Eyes: denies vision changes, denies blurry vision, denies eye pain, denies eye discharge  ENT: denies ear pain, denies hearing loss, denies tinnitus, denies nasal congestion, denies sore throat  Cardiovascular: denies chest pain, denies palpitations, denies lower extremity edema  Respiratory: denies cough, denies shortness of breath, denies wheezing, denies sputum production  Endocrine: denies polyuria, denies polydipsia, denies heat intolerance, denies cold intolerance  Gastrointestinal: denies abdominal pain, denies heartburn, denies nausea, denies vomiting, denies diarrhea, denies constipation, denies blood in stool  Genitourinary: denies dysuria, denies urgency, denies frequency, denies hematuria, denies nocturia, denies incontinence  Heme & Lymphatic: denies easy or excessive bleeding, denies easy bruising, denies swollen lymph nodes  Musculoskeletal: denies muscle pain, denies back pain, denies joint pain, denies joint swelling  Skin: denies rash, denies lesion, denies itching, denies skin texture changes, denies skin color changes  Neurological: denies headache, denies dizziness, denies numbness, denies tingling, denies seizure activity, denies speech difficulty, denies memory loss, denies confusion  Psychiatric: denies anxiety,  denies depression, denies sleep difficulty           Past Medical History:   Diagnosis Date    Abnormal Pap smear of cervix 01/15/2021    ASCUS Pap/+HPV 16    Anemia     Trichomoniasis 5/12/2021    Vaginitis and vulvovaginitis 5/12/2021       Social History:  Social History     Socioeconomic History    Marital status: Single    Number of children: 0   Tobacco Use    Smoking status: Never    Smokeless tobacco: Never   Substance and Sexual Activity    Alcohol use: Yes     Comment: occasional     Drug use: Never    Sexual activity: Yes     Partners: Female     Birth control/protection: None     Social Drivers of Health     Financial Resource Strain: Low Risk  (7/26/2024)    Overall Financial Resource Strain (CARDIA)     Difficulty of Paying Living Expenses: Not hard at all   Food Insecurity: No Food Insecurity (7/26/2024)    Hunger Vital Sign     Worried About Running Out of Food in the Last Year: Never true     Ran Out of Food in the Last Year: Never true   Transportation Needs: No Transportation Needs (12/20/2021)    PRAPARE - Transportation     Lack of Transportation (Medical): No     Lack of Transportation (Non-Medical): No   Physical Activity: Sufficiently Active (7/26/2024)    Exercise Vital Sign     Days of Exercise per Week: 3 days     Minutes of Exercise per Session: 80 min   Stress: No Stress Concern Present (7/26/2024)    [employer] - Occupational Stress Questionnaire     Feeling of Stress : Only a little   Housing Stability: Low Risk  (12/20/2021)    Housing Stability Vital Sign     Unable to Pay for Housing in the Last Year: No     Number of Places Lived in the Last Year: 1     Unstable Housing in the Last Year: No       Family History:   family history includes Diabetes in her mother and another family member; Heart disease in her mother and another family member; Hypertension in her brother, mother, and another family member.    Health Maintenance   Topic Date Due    TETANUS  VACCINE  Never done    Cervical Cancer Screening  04/20/2024    COVID-19 Vaccine (3 - 2024-25 season) 09/01/2024    Influenza Vaccine (1) 09/01/2025    Hemoglobin A1c (Diabetic Prevention Screening)  10/10/2027    RSV Vaccine (Age 60+ and Pregnant patients) (1 - 1-dose 75+ series) 08/12/2061    Hepatitis C Screening  Completed    HIV Screening  Completed    Lipid Panel  Completed    Pneumococcal Vaccines (Age 0-49)  Aged Out       Exam:Physical     Vital Signs  BP: 110/88  Height and Weight  Weight: 94.8 kg (209 lb)]    Body mass index is 38.23 kg/m².    Physical Exam    Vitals: Weight: 209 lbs. Blood pressure: 110/88.          Assessment:        ICD-10-CM ICD-9-CM   1. Primary hypertension  I10 401.9   2. Morbid obesity with BMI of 40.0-44.9, adult  E66.01 278.01    Z68.41 V85.41         Plan:    Assessment & Plan    MEDICAL DECISION MAKING:  - Assessed progress on tirzepatide 2.5 mg for weight management, noting significant weight loss from 227 lbs to 209 lbs since last visit.  - Increased tirzepatide from 2.5 mg to next dosage based on patient's request and lack of side effects.  - BP appears well-managed on current medication regimen.  - Continued valsartan HCTZ at current dose for BP management.    PATIENT EDUCATION:  - Explained importance of gradual weight loss to prevent excess loose skin.  - Discussed benefits of incorporating weight training exercises to build muscle during weight loss.    ACTION ITEMS/LIFESTYLE:  - Patient to continue weight training exercises to build muscle mass.    MEDICATIONS:  - Increased tirzepatide from 2.5 mg to next dosage based on patient's request and lack of side effects.  - Continued valsartan HCTZ at current dose for BP management.    FOLLOW UP:  - Follow up in 1-2 months to assess progress on increased tirzepatide dosage and overall weight loss.         Diagnoses and all orders for this visit:    Primary hypertension    Morbid obesity with BMI of 40.0-44.9, adult        No  follow-ups on file.      Yana Patel MD    The patient location is: Home   The chief complaint leading to consultation is: as below  Visit type: Virtual visit with synchronous audio and video  Total time spent with patient: 20+ mins  Each patient to whom he or she provides medical services by telemedicine is:  (1) informed of the relationship between the physician and patient and the respective role of any other health care provider with respect to management of the patient; and (2) notified that he or she may decline to receive medical services by telemedicine and may withdraw from such care at any time.    Notes: see below

## 2025-07-28 ENCOUNTER — PATIENT MESSAGE (OUTPATIENT)
Dept: FAMILY MEDICINE | Facility: CLINIC | Age: 40
End: 2025-07-28
Payer: COMMERCIAL